# Patient Record
(demographics unavailable — no encounter records)

---

## 2025-01-21 NOTE — PHYSICAL EXAM
[Well Nourished] : well nourished [No Acute Distress] : no acute distress [Well Developed] : well developed [Well-Appearing] : well-appearing [Normal Sclera/Conjunctiva] : normal sclera/conjunctiva [PERRL] : pupils equal round and reactive to light [EOMI] : extraocular movements intact [Normal Oropharynx] : the oropharynx was normal [Normal Outer Ear/Nose] : the outer ears and nose were normal in appearance [No JVD] : no jugular venous distention [No Lymphadenopathy] : no lymphadenopathy [Supple] : supple [Thyroid Normal, No Nodules] : the thyroid was normal and there were no nodules present [No Respiratory Distress] : no respiratory distress  [No Accessory Muscle Use] : no accessory muscle use [Clear to Auscultation] : lungs were clear to auscultation bilaterally [Normal Rate] : normal rate  [Regular Rhythm] : with a regular rhythm [Normal S1, S2] : normal S1 and S2 [No Murmur] : no murmur heard [No Carotid Bruits] : no carotid bruits [No Varicosities] : no varicosities [No Abdominal Bruit] : a ~M bruit was not heard ~T in the abdomen [Pedal Pulses Present] : the pedal pulses are present [No Edema] : there was no peripheral edema [No Palpable Aorta] : no palpable aorta [No Extremity Clubbing/Cyanosis] : no extremity clubbing/cyanosis [Soft] : abdomen soft [Non Tender] : non-tender [Non-distended] : non-distended [No Masses] : no abdominal mass palpated [No HSM] : no HSM [Normal Bowel Sounds] : normal bowel sounds [Normal Posterior Cervical Nodes] : no posterior cervical lymphadenopathy [No CVA Tenderness] : no CVA  tenderness [Normal Anterior Cervical Nodes] : no anterior cervical lymphadenopathy [No Spinal Tenderness] : no spinal tenderness [No Joint Swelling] : no joint swelling [Grossly Normal Strength/Tone] : grossly normal strength/tone [No Rash] : no rash [Coordination Grossly Intact] : coordination grossly intact [No Focal Deficits] : no focal deficits [Normal Gait] : normal gait [Deep Tendon Reflexes (DTR)] : deep tendon reflexes were 2+ and symmetric [Normal Affect] : the affect was normal [Normal Insight/Judgement] : insight and judgment were intact

## 2025-01-21 NOTE — PHYSICAL EXAM
[Well Nourished] : well nourished [No Acute Distress] : no acute distress [Well Developed] : well developed [Well-Appearing] : well-appearing [Normal Sclera/Conjunctiva] : normal sclera/conjunctiva [PERRL] : pupils equal round and reactive to light [EOMI] : extraocular movements intact [Normal Outer Ear/Nose] : the outer ears and nose were normal in appearance [Normal Oropharynx] : the oropharynx was normal [No JVD] : no jugular venous distention [No Lymphadenopathy] : no lymphadenopathy [Supple] : supple [Thyroid Normal, No Nodules] : the thyroid was normal and there were no nodules present [No Respiratory Distress] : no respiratory distress  [Clear to Auscultation] : lungs were clear to auscultation bilaterally [No Accessory Muscle Use] : no accessory muscle use [Normal Rate] : normal rate  [Regular Rhythm] : with a regular rhythm [Normal S1, S2] : normal S1 and S2 [No Murmur] : no murmur heard [No Carotid Bruits] : no carotid bruits [No Varicosities] : no varicosities [No Abdominal Bruit] : a ~M bruit was not heard ~T in the abdomen [Pedal Pulses Present] : the pedal pulses are present [No Edema] : there was no peripheral edema [No Palpable Aorta] : no palpable aorta [No Extremity Clubbing/Cyanosis] : no extremity clubbing/cyanosis [Non Tender] : non-tender [Soft] : abdomen soft [Non-distended] : non-distended [No Masses] : no abdominal mass palpated [No HSM] : no HSM [Normal Bowel Sounds] : normal bowel sounds [Normal Posterior Cervical Nodes] : no posterior cervical lymphadenopathy [Normal Anterior Cervical Nodes] : no anterior cervical lymphadenopathy [No CVA Tenderness] : no CVA  tenderness [No Spinal Tenderness] : no spinal tenderness [No Joint Swelling] : no joint swelling [Grossly Normal Strength/Tone] : grossly normal strength/tone [No Rash] : no rash [Coordination Grossly Intact] : coordination grossly intact [Normal Gait] : normal gait [No Focal Deficits] : no focal deficits [Deep Tendon Reflexes (DTR)] : deep tendon reflexes were 2+ and symmetric [Normal Affect] : the affect was normal [Normal Insight/Judgement] : insight and judgment were intact

## 2025-01-22 NOTE — HISTORY OF PRESENT ILLNESS
[FreeTextEntry1] : TILA DE LA CRUZ is a 26 year old male here for a physical exam. [de-identified] : His last physical exam was unknown  Vaccines: Tetanus is up to date per patient  His last dentist visit was less than one year ago His last eye doctor appointment was several years ago, had laser eye surgery in 2018 His last dermatologist visit was 4 years ago  His diet is moderately healthy overall Exercise: cardio and weights

## 2025-01-22 NOTE — HISTORY OF PRESENT ILLNESS
[FreeTextEntry1] : TILA DE LA CRUZ is a 26 year old male here for a physical exam. [de-identified] : His last physical exam was unknown  Vaccines: Tetanus is up to date per patient  His last dentist visit was less than one year ago His last eye doctor appointment was several years ago, had laser eye surgery in 2018 His last dermatologist visit was 4 years ago  His diet is moderately healthy overall Exercise: cardio and weights

## 2025-01-22 NOTE — ASSESSMENT
[FreeTextEntry1] : TILA DE LA CRUZ is a 26 year old male here for a physical exam.  He is a new patient to our practice. 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 He saw an ID specialist who did not feel he had Babesiosis.  His hemoglobin was 12.9 on labs ordered by ID. This was not addressed. Will repeat his CBC and order additional labs for anemia as well.  He reports recent anxiety since he broke up with his girlfriend. He works overnights and has been having trouble with insomnia as well. He has seen a therapist in the past but not recently. He is wondering if he needs medication for anxiety, insomnia, or both.

## 2025-01-22 NOTE — PLAN
[FreeTextEntry1] : Check labs as above.   I agreed to prescribe a small supply of Xanax and Ambien for him to try. He will NOT take these together and will try the Xanax first to see if his insomnia is related to anxiety. Since he works nights, he may benefit from intermittent use of Ambien but he will try it a few times first to make sure it does not cause drowsiness the next day.  Reviewed age-appropriate preventive screening tests with patient.  Discussed clean eating (eg Mediterranean style eating plan) and regular exercise/staying as physically active as possible.  Include balance exercises and strength training and core strengthening exercises for bone health and to decrease risk for falls.  Reviewed importance of good self care (e.g. meditation, yoga, adequate rest, regular exercise, magnesium, clean eating, etc.).  Follow up for next physical in one to two years.

## 2025-01-22 NOTE — HEALTH RISK ASSESSMENT
[0] : 2) Feeling down, depressed, or hopeless: Not at all (0) [PHQ-2 Negative - No further assessment needed] : PHQ-2 Negative - No further assessment needed [Never] : Never [[id number]] : 0

## 2025-01-22 NOTE — HEALTH RISK ASSESSMENT
[0] : 2) Feeling down, depressed, or hopeless: Not at all (0) [PHQ-2 Negative - No further assessment needed] : PHQ-2 Negative - No further assessment needed [Never] : Never [[id number]] : 0

## 2025-07-24 NOTE — END OF VISIT
[] : Fellow [FreeTextEntry3] : 28 yo healthy M with father with Gilbert syndrome, recently seen in the ER at [facility] on 6/23/25 due to a recent acute illness with findings consistent with acute EBV hepatitis including typical symptoms, reactive lymphocytosis, mild hepatosplenomegaly, elevated liver chemistries (predominantly AST and ALT) with mild associated hyperbilirubinemia, and EBV viremia. He is now clinically improving apart from some recurrent sore throat for which we advised he seek re-evaluation with his PCP or urgent care to rule out a bacterial superinfection. Old labs from 1/2025 (months before his acute illness) notable for borderline transferrin saturation 24% and low ferritin 17. We are re-checking iron studies along with labs today as well as anti-TTG IgA (plus quantitative IgA). If he has iron deficiency, he will need further GI work-up with his gastroenterologist Dr. Anival Sanchez including to rule out iron malabsorption versus occult GI blood loss. He is HAV and HBV immune.

## 2025-07-24 NOTE — ASSESSMENT
[FreeTextEntry1] : Mr. DE LA CRUZ is a 27 year-old man who had mononucleosis with elevated AST/ALT and bilirubin in late June. Two ultrasounds showed hepatosplenomegaly, then mild splenomegaly. Now recurrent tonsillar pain for several days.  - mild elevation of bilirubin 1.6 mg/dL and ALT 60 U/L on 6/17/25. Bilirubin was 1.4 in 1/2025, all LFTs normal in July 2024.  - thrombocytopenia 112 G/L, new in 6/2025 - overweight, BMI 27 - Fib-4 1.18 in 6/2025 - no significant fibrosis - low ferritin in January 2025, no overt bleeding  Pt. was reassured that mononucleosis resolves without long-term sequelae and that he should avoid abdominal trauma e.g. by contact sports for a couple of months  Plan:  - repeat LFTs today - f/u with PCP for Strep throat testing - iron panel, ferritin, TTG-IgA, total IgA

## 2025-07-24 NOTE — PHYSICAL EXAM
[Scleral Icterus] : No Scleral Icterus [Spider Angioma] : No spider angioma(s) were observed [Abdominal  Ascites] : no ascites [Splenomegaly] : splenomegaly [Caput Medusae] : no caput medusae observed [Non-Tender] : non-tender [Smooth] : smooth [Asterixis] : no asterixis observed [Jaundice] : No jaundice [Palmar Erythema] : no Palmar Erythema [General Appearance - Alert] : alert [General Appearance - In No Acute Distress] : in no acute distress [General Appearance - Well Nourished] : well nourished [General Appearance - Well Developed] : well developed [General Appearance - Well-Appearing] : healthy appearing [Sclera] : the sclera and conjunctiva were normal [Hearing Threshold Finger Rub Not [place]] : hearing was normal [Examination Of The Oral Cavity] : the lips and gums were normal [Neck Appearance] : the appearance of the neck was normal [Neck Cervical Mass (___cm)] : no neck mass was observed [Jugular Venous Distention Increased] : there was no jugular-venous distention [Respiration, Rhythm And Depth] : normal respiratory rhythm and effort [Exaggerated Use Of Accessory Muscles For Inspiration] : no accessory muscle use [Auscultation Breath Sounds / Voice Sounds] : lungs were clear to auscultation bilaterally [Heart Rate And Rhythm] : heart rate was normal and rhythm regular [Heart Sounds] : normal S1 and S2 [Murmurs] : no murmurs [Edema] : there was no peripheral edema [Bowel Sounds] : normal bowel sounds [Abdomen Soft] : soft [Abdomen Tenderness] : non-tender [Abdomen Mass (___ Cm)] : no abdominal mass palpated [FreeTextEntry1] : +mild palpable splenomegaly, no palpable hepatomegaly [Cervical Lymph Nodes Enlarged Posterior Bilaterally] : posterior cervical [Cervical Lymph Nodes Enlarged Anterior Bilaterally] : anterior cervical [Abnormal Walk] : normal gait [Nail Clubbing] : no clubbing  or cyanosis of the fingernails [Involuntary Movements] : no involuntary movements were seen [Motor Tone] : muscle strength and tone were normal [Skin Color & Pigmentation] : normal skin color and pigmentation [Skin Turgor] : normal skin turgor [] : no rash [Oriented To Time, Place, And Person] : oriented to person, place, and time [Impaired Insight] : insight and judgment were intact [Affect] : the affect was normal [Mood] : the mood was normal [Memory Recent] : recent memory was not impaired [Memory Remote] : remote memory was not impaired

## 2025-07-24 NOTE — END OF VISIT
[] : Fellow [FreeTextEntry3] : 28 yo healthy M with father with Gilbert syndrome, recently seen in the ER at [facility] on 6/23/25 due to a recent acute illness with findings consistent with acute EBV hepatitis including typical symptoms, reactive lymphocytosis, mild hepatosplenomegaly, elevated liver chemistries (predominantly AST and ALT) with mild associated hyperbilirubinemia, and EBV viremia. He is now clinically improving apart from some recurrent sore throat for which we advised he seek re-evaluation with his PCP or urgent care to rule out a bacterial superinfection. Old labs from 1/2025 (months before his acute illness) notable for borderline transferrin saturation 24% and low ferritin 17. We are re-checking iron studies along with labs today as well as anti-TTG IgA (plus quantitative IgA). If he has iron deficiency, he will need further GI work-up with his gastroenterologist Dr. Anival Sanchez including to rule out iron malabsorption versus occult GI blood loss. He is HAV and HBV immune.

## 2025-07-24 NOTE — PHYSICAL EXAM
[Scleral Icterus] : No Scleral Icterus [Spider Angioma] : No spider angioma(s) were observed [Abdominal  Ascites] : no ascites [Splenomegaly] : splenomegaly [Caput Medusae] : no caput medusae observed [Non-Tender] : non-tender [Smooth] : smooth [Asterixis] : no asterixis observed [Jaundice] : No jaundice [Palmar Erythema] : no Palmar Erythema [General Appearance - Alert] : alert [General Appearance - In No Acute Distress] : in no acute distress [General Appearance - Well Nourished] : well nourished [General Appearance - Well Developed] : well developed [General Appearance - Well-Appearing] : healthy appearing [Sclera] : the sclera and conjunctiva were normal [Hearing Threshold Finger Rub Not [place]] : hearing was normal [Examination Of The Oral Cavity] : the lips and gums were normal [Neck Appearance] : the appearance of the neck was normal [Neck Cervical Mass (___cm)] : no neck mass was observed [Jugular Venous Distention Increased] : there was no jugular-venous distention [Respiration, Rhythm And Depth] : normal respiratory rhythm and effort [Exaggerated Use Of Accessory Muscles For Inspiration] : no accessory muscle use [Auscultation Breath Sounds / Voice Sounds] : lungs were clear to auscultation bilaterally [Heart Rate And Rhythm] : heart rate was normal and rhythm regular [Heart Sounds] : normal S1 and S2 [Murmurs] : no murmurs [Edema] : there was no peripheral edema [Bowel Sounds] : normal bowel sounds [Abdomen Soft] : soft [Abdomen Tenderness] : non-tender [Abdomen Mass (___ Cm)] : no abdominal mass palpated [FreeTextEntry1] : +mild palpable splenomegaly, no palpable hepatomegaly [Cervical Lymph Nodes Enlarged Posterior Bilaterally] : posterior cervical [Cervical Lymph Nodes Enlarged Anterior Bilaterally] : anterior cervical [Abnormal Walk] : normal gait [Nail Clubbing] : no clubbing  or cyanosis of the fingernails [Involuntary Movements] : no involuntary movements were seen [Motor Tone] : muscle strength and tone were normal [Skin Color & Pigmentation] : normal skin color and pigmentation [Skin Turgor] : normal skin turgor [] : no rash [Oriented To Time, Place, And Person] : oriented to person, place, and time [Impaired Insight] : insight and judgment were intact [Affect] : the affect was normal [Mood] : the mood was normal [Memory Recent] : recent memory was not impaired [Memory Remote] : remote memory was not impaired

## 2025-07-24 NOTE — HISTORY OF PRESENT ILLNESS
[FreeTextEntry1] : Mr. DE LA CRUZ is a 27-year-old man who was referred because of elevated bilirubin 1.6 mg/dL and ALT 60 U/L on 6/17/25.  G/L, creatinine 1.12 normal. He was diagnosed with mononucleaosis in June- with fever, night sweats and throat pain that made him go to the ED at [facility] on June 23 2025..He was found to have elevated LFTs with increasing liver enzymes - AST//725 on 6/26/25`, and an ultrasound found hepatosplenomegaly. His symptoms resolved completely for 2 weeks, but he developed recurrent neck swelling and neck pain. three days ago. He struggles to eat and drink, and talking is painful as well.  Labs 6/23/25: EBV . Antibodies not available Labs 7/09: AST/ALT 52/252,  He had pos. Babesia Ab 1:160 a year ago on 7/12/24. The Babesia PCR was negative this year, on 6/17/25. Folate was low, 4.1 ng/mL (>=4.7), MCV has been normal.    Weight history: 184 lbs, BMI 27.2 in 7/2025 Alcohol history: once a month, never daily Remedies/OTC meds:   Test results: - 7/03/25 US abdomen: liver normal, splenomeglay 15.7 cm.  - 6/23/25 US abdomen: hepatomegaly, splenomegaly - 6/19/25 normal: HAV IgM, HBsAg, sAb, cAb, HCV Ab

## 2025-07-24 NOTE — HISTORY OF PRESENT ILLNESS
[FreeTextEntry1] : Mr. DE LA CRUZ is a 27-year-old man who was referred because of elevated bilirubin 1.6 mg/dL and ALT 60 U/L on 6/17/25.  G/L, creatinine 1.12 normal. He was diagnosed with mononucleaosis in June- with fever, night sweats and throat pain that made him go to the ED at [facility] on June 23 2025..He was found to have elevated LFTs with increasing liver enzymes - AST//725 on 6/26/25`, and an ultrasound found hepatosplenomegaly. His symptoms resolved completely for 2 weeks, but he developed recurrent neck swelling and neck pain. three days ago. He struggles to eat and drink, and talking is painful as well.  Labs 6/23/25: EBV . Antibodies not available Labs 7/09: AST/ALT 52/252,  He had pos. Babesia Ab 1:160 a year ago on 7/12/24. The Babesia PCR was negative this year, on 6/17/25. Folate was low, 4.1 ng/mL (>=4.7), MCV has been normal.    Weight history: 184 lbs, BMI 27.2 in 7/2025 Alcohol history: once a month, never daily Remedies/OTC meds:   Test results: - 7/03/25 US abdomen: liver normal, splenomeglay 15.7 cm.  - 6/23/25 US abdomen: hepatomegaly, splenomegaly - 6/19/25 normal: HAV IgM, HBsAg, sAb, cAb, HCV Ab